# Patient Record
Sex: MALE | Race: WHITE | NOT HISPANIC OR LATINO | Employment: STUDENT | URBAN - METROPOLITAN AREA
[De-identification: names, ages, dates, MRNs, and addresses within clinical notes are randomized per-mention and may not be internally consistent; named-entity substitution may affect disease eponyms.]

---
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
## 2018-05-24 RX ORDER — DIAZEPAM ORAL 5 MG/5ML
SOLUTION ORAL
Qty: 450 ML | Refills: 0 | Status: SHIPPED | OUTPATIENT
Start: 2018-05-24 | End: 2018-06-20 | Stop reason: SDUPTHER

## 2018-05-24 NOTE — TELEPHONE ENCOUNTER
MA telephone mom to help schedule pt appt  MA offered 6/10@10am   Mom accept and voiced time and date of appt

## 2018-05-24 NOTE — TELEPHONE ENCOUNTER
----- Message from Kelly Duncan sent at 2018  3:43 PM CDT -----  Contact: Mom [phone]  The pt prescription is  so they need a new on sent over for the diazepam. The pharmacy on file is correct. Please call mom once this has been done.

## 2018-06-01 ENCOUNTER — TELEPHONE (OUTPATIENT)
Dept: OTOLARYNGOLOGY | Facility: CLINIC | Age: 18
End: 2018-06-01

## 2018-06-01 NOTE — TELEPHONE ENCOUNTER
Mom wanted Dr. Main to prescribe some medication for Jaiden, but Jaiden has not been here since 2014,  He has to be seen before medication can be prescribe.  Ear is draining and has a perforation.

## 2018-06-01 NOTE — TELEPHONE ENCOUNTER
----- Message from Brynn Mckinnon sent at 6/1/2018 11:29 AM CDT -----  Contact: patients mother   Needs Advice    Reason for call: Patients mother is calling to speak with someone about above patients ears. Patient possibly has ear infection and mom would like to know if there is something that can be prescribed until patient can get an appt.     Communication Preference: [phone]

## 2018-06-01 NOTE — TELEPHONE ENCOUNTER
----- Message from Ty Thorne sent at 6/1/2018 12:29 PM CDT -----  Contact: Mom   Patient Returning Call from [facility]    Who Left Message for Patient:Patricia  Communication Preference:[phone]  Additional Information:

## 2018-06-19 ENCOUNTER — PATIENT MESSAGE (OUTPATIENT)
Dept: GENETICS | Facility: CLINIC | Age: 18
End: 2018-06-19

## 2018-06-19 ENCOUNTER — TELEPHONE (OUTPATIENT)
Dept: GENETICS | Facility: CLINIC | Age: 18
End: 2018-06-19

## 2018-06-19 NOTE — TELEPHONE ENCOUNTER
----- Message from Christina Lee sent at 6/19/2018  4:53 PM CDT -----  Contact: [phone]  Needs Advice    Reason for call:The mom would like to have lab work done tomorrow      Communication Preference:Requesting a call back----[phone]  Additional Information:

## 2018-06-20 ENCOUNTER — OFFICE VISIT (OUTPATIENT)
Dept: PEDIATRIC NEUROLOGY | Facility: CLINIC | Age: 18
End: 2018-06-20
Payer: COMMERCIAL

## 2018-06-20 ENCOUNTER — LAB VISIT (OUTPATIENT)
Dept: LAB | Facility: HOSPITAL | Age: 18
End: 2018-06-20
Attending: PSYCHIATRY & NEUROLOGY
Payer: COMMERCIAL

## 2018-06-20 VITALS
HEART RATE: 85 BPM | SYSTOLIC BLOOD PRESSURE: 110 MMHG | DIASTOLIC BLOOD PRESSURE: 74 MMHG | WEIGHT: 127 LBS | BODY MASS INDEX: 23.98 KG/M2 | HEIGHT: 61 IN

## 2018-06-20 DIAGNOSIS — E88.40 MITOCHONDRIAL METABOLISM DISORDER: ICD-10-CM

## 2018-06-20 DIAGNOSIS — E88.40 MITOCHONDRIAL METABOLISM DISORDER: Primary | ICD-10-CM

## 2018-06-20 LAB
25(OH)D3+25(OH)D2 SERPL-MCNC: 38 NG/ML
ALBUMIN SERPL BCP-MCNC: 4.6 G/DL
ALP SERPL-CCNC: 120 U/L
ALT SERPL W/O P-5'-P-CCNC: 23 U/L
ANION GAP SERPL CALC-SCNC: 6 MMOL/L
AST SERPL-CCNC: 20 U/L
BASOPHILS # BLD AUTO: 0.03 K/UL
BASOPHILS NFR BLD: 0.5 %
BILIRUB SERPL-MCNC: 0.4 MG/DL
BUN SERPL-MCNC: 7 MG/DL
CALCIUM SERPL-MCNC: 10.3 MG/DL
CHLORIDE SERPL-SCNC: 101 MMOL/L
CO2 SERPL-SCNC: 30 MMOL/L
CREAT SERPL-MCNC: 0.8 MG/DL
DIFFERENTIAL METHOD: ABNORMAL
EOSINOPHIL # BLD AUTO: 0.1 K/UL
EOSINOPHIL NFR BLD: 0.8 %
ERYTHROCYTE [DISTWIDTH] IN BLOOD BY AUTOMATED COUNT: 13.5 %
EST. GFR  (AFRICAN AMERICAN): >60 ML/MIN/1.73 M^2
EST. GFR  (NON AFRICAN AMERICAN): >60 ML/MIN/1.73 M^2
GLUCOSE SERPL-MCNC: 88 MG/DL
HCT VFR BLD AUTO: 43.3 %
HGB BLD-MCNC: 14.3 G/DL
LACTATE SERPL-SCNC: 0.9 MMOL/L
LYMPHOCYTES # BLD AUTO: 2.4 K/UL
LYMPHOCYTES NFR BLD: 36.6 %
MCH RBC QN AUTO: 29 PG
MCHC RBC AUTO-ENTMCNC: 33 G/DL
MCV RBC AUTO: 88 FL
MONOCYTES # BLD AUTO: 0.5 K/UL
MONOCYTES NFR BLD: 7.5 %
NEUTROPHILS # BLD AUTO: 3.5 K/UL
NEUTROPHILS NFR BLD: 54.6 %
PLATELET # BLD AUTO: 404 K/UL
PMV BLD AUTO: 8.9 FL
POTASSIUM SERPL-SCNC: 4.8 MMOL/L
PROT SERPL-MCNC: 7.9 G/DL
RBC # BLD AUTO: 4.93 M/UL
SODIUM SERPL-SCNC: 137 MMOL/L
WBC # BLD AUTO: 6.42 K/UL

## 2018-06-20 PROCEDURE — 80053 COMPREHEN METABOLIC PANEL: CPT

## 2018-06-20 PROCEDURE — 82542 COL CHROMOTOGRAPHY QUAL/QUAN: CPT

## 2018-06-20 PROCEDURE — 83605 ASSAY OF LACTIC ACID: CPT

## 2018-06-20 PROCEDURE — 85025 COMPLETE CBC W/AUTO DIFF WBC: CPT | Mod: PO

## 2018-06-20 PROCEDURE — 83520 IMMUNOASSAY QUANT NOS NONAB: CPT

## 2018-06-20 PROCEDURE — 3008F BODY MASS INDEX DOCD: CPT | Mod: CPTII,S$GLB,, | Performed by: PSYCHIATRY & NEUROLOGY

## 2018-06-20 PROCEDURE — 99999 PR PBB SHADOW E&M-EST. PATIENT-LVL III: CPT | Mod: PBBFAC,,, | Performed by: PSYCHIATRY & NEUROLOGY

## 2018-06-20 PROCEDURE — 36415 COLL VENOUS BLD VENIPUNCTURE: CPT | Mod: PO

## 2018-06-20 PROCEDURE — 84210 ASSAY OF PYRUVATE: CPT

## 2018-06-20 PROCEDURE — 99214 OFFICE O/P EST MOD 30 MIN: CPT | Mod: S$GLB,,, | Performed by: PSYCHIATRY & NEUROLOGY

## 2018-06-20 PROCEDURE — 83918 ORGANIC ACIDS TOTAL QUANT: CPT

## 2018-06-20 PROCEDURE — 82306 VITAMIN D 25 HYDROXY: CPT

## 2018-06-20 RX ORDER — DIAZEPAM ORAL 5 MG/5ML
SOLUTION ORAL
Qty: 390 ML | Refills: 4 | Status: SHIPPED | OUTPATIENT
Start: 2018-06-20 | End: 2018-12-12

## 2018-06-20 NOTE — PROGRESS NOTES
Jaiden Sena is a 17-year-old male who was initially seen by me on 11/17/2009.    I followed Jaiden for seizures associated with autism, associated with   mitochondrial disease.  Jaiden returns today with his mother, father, brother.    Please see my original consultation of 11/17/2009 for a full history of chief   complaint.    I last saw Jaiden on 11/14/2017.    Initially, Jaiden was started on phenobarbital for his seizure disorder.  It did   not work well.  His seizure disorder has been controlled with Valium at   bedtime.  There was a question of a seizure over the summer of 2014.  Since   then, there have been no documented seizures.  I am trying to taper Jaiden's   nighttime Valium.  It has been challenging for mom to follow the directions.  At   this time, Jaiden was supposed to be on 13 mL p.o. at bedtime.  Mom is not sure   how much he is on.  She thinks he might be on 14 or 15 mL p.o. at bedtime.  It   is unlike mom to not know how much Jaiden is on.    Either way, I am going to drop Jaiden to 13 mL p.o. at bedtime for two months   and then 12 mL p.o. at bedtime.    Everyone is wearing their mask in the room today.    Jaiden has recently had a bout of otitis media associated with water in his ear   while diving and swimming.    Jaiden has had a significant weight loss.  Mom says he is eating well, but that   they have cut out the sugar.  Jaiden is sleeping well.    On neurologic examination today, Jaiden's weight is 57.6 kg (8th percentile,   down from the 37th percentile; last time Jaiden was here, his weight was 65.7   kg; weight loss of 8.1 kilos since November 2017).  Respiratory rate is 22 per   minute.  Blood pressure is 110/74.  Pulse rate is 85 per minute.    Jaiden is wearing his mask.  He and his brother are texting each other on their   cell phones (which are in zip locks) while he is here.  He does not make eye   contact with me.  He does acknowledge me.    Jaiden has no ataxia.  He has no  dysmetria.  He has no nystagmus.    Jaiden is a 17-year-old male with a history of mitochondrial disease, seizures,   well controlled; muscle spasms, fatigue, family history of mitochondrial   disease.  Again, I have explained to mother that I would like to continue to   decrease the Valium.  I have ordered labs to be seen by Genetics.    I would like to see Jaiden back in six months or sooner if there are problems.    Please send a copy to Dr. Venita Ayala.      YANICK/GRACIE  dd: 06/20/2018 11:01:59 (CDT)  td: 06/21/2018 04:02:54 (CDT)  Doc ID   #[id number]  Job ID #[id number]    CC: Venita Ayala M.D.

## 2018-06-21 LAB — TRYPTASE LEVEL: 3.2 NG/ML

## 2018-06-24 LAB — PYRUVATE BLD-SCNC: 0.06 MMOL/L (ref 0.03–0.11)

## 2018-06-26 LAB
ACYLCARNITINE SERPL-SCNC: 9 UMOL/L (ref 3–38)
CARNITINE FREE SERPL-SCNC: 43 UMOL/L (ref 22–63)
CARNITINE SERPL-SCNC: 0.2 UMOL/L (ref 0.1–0.9)
CARNITINE SERPL-SCNC: 52 UMOL/L (ref 31–78)

## 2018-06-29 LAB
2OXO3ME-VALERATE SERPL-SCNC: 15 UMOL/L (ref 10–30)
2OXOISOVALERATE SERPL-SCNC: 14 UMOL/L (ref 3–20)
2OXOISOVALERATE SERPL-SCNC: 19 UMOL/L (ref 20–75)
ACETOACET SERPL-SCNC: 4 UMOL/L (ref 0–66)
B-OH-BUTYR SERPL-SCNC: 17 UMOL/L (ref 0–30)
CITRATE SERPL-SCNC: 26 UMOL/L (ref 0–100)
COENZYME Q10, LEUKOCYTE: 315 PMOL/MG PROTEIN
LACTATE SERPL-SCNC: 1261 UMOL/L (ref 600–2600)
ORGANIC ACIDS PATTERN SERPL-IMP: NORMAL
PYRUVATE SERPL-SCNC: 78 UMOL/L (ref 20–140)
SUCCINATE SERPL-SCNC: 5 UMOL/L (ref 16–25)

## 2018-11-08 ENCOUNTER — TELEPHONE (OUTPATIENT)
Dept: OPTOMETRY | Facility: CLINIC | Age: 18
End: 2018-11-08

## 2018-11-21 ENCOUNTER — TELEPHONE (OUTPATIENT)
Dept: PEDIATRIC NEUROLOGY | Facility: CLINIC | Age: 18
End: 2018-11-21

## 2018-11-21 NOTE — TELEPHONE ENCOUNTER
----- Message from Kaylin Bautista sent at 11/21/2018  4:46 PM CST -----  Contact: Mom [phone]  Needs Advice    Reason for call:        Communication Preference: Mom [phone]    Additional Information:  Mom stated that pt has been having pain and twitching in his eye and temple area. Mom would like to speak with dr or nurse as soon as possible. She also stated that she having trouble getting into MyChart.

## 2018-11-21 NOTE — TELEPHONE ENCOUNTER
Mother states patient had been squinting his eyes and he is complaining of Right eye pain and right temporal twitching. Mother states he has an urgent eye appt Friday. Advised her to call after appt to give us an update and possible appt with Dr Leger

## 2018-11-23 ENCOUNTER — PATIENT MESSAGE (OUTPATIENT)
Dept: PEDIATRIC NEUROLOGY | Facility: CLINIC | Age: 18
End: 2018-11-23

## 2018-11-23 ENCOUNTER — PATIENT MESSAGE (OUTPATIENT)
Dept: GENETICS | Facility: CLINIC | Age: 18
End: 2018-11-23

## 2018-11-23 ENCOUNTER — OFFICE VISIT (OUTPATIENT)
Dept: OPTOMETRY | Facility: CLINIC | Age: 18
End: 2018-11-23
Payer: COMMERCIAL

## 2018-11-23 DIAGNOSIS — E88.40 DISORDER OF MITOCHONDRIAL METABOLISM: Primary | ICD-10-CM

## 2018-11-23 DIAGNOSIS — H52.13 MYOPIA OF BOTH EYES: ICD-10-CM

## 2018-11-23 PROCEDURE — 92015 DETERMINE REFRACTIVE STATE: CPT | Mod: S$GLB,,, | Performed by: OPTOMETRIST

## 2018-11-23 PROCEDURE — 92014 COMPRE OPH EXAM EST PT 1/>: CPT | Mod: S$GLB,,, | Performed by: OPTOMETRIST

## 2018-11-23 PROCEDURE — 99999 PR PBB SHADOW E&M-EST. PATIENT-LVL II: CPT | Mod: PBBFAC,,, | Performed by: OPTOMETRIST

## 2018-11-23 NOTE — PROGRESS NOTES
HPI     Jaiden Sena is a 18 y.o. male who is brought in by his parents  for   continued eye care. 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  He also has a seizure disorder. His last exam with me was   6/23/18. He was noted to have moderate bilateral myopia at that time.   There was no ptosis, strabismus or other ocular manifestation of turner   disease . Mom reports noting a twitching of a muscle in Jaiden's right   temporal. She reports that it is almost constant, especially when Jaiden   is looking at something. . He has also demonstrated symptoms about   bilateral eye pain which has progressed over the last 3 weeks.  He has   also complained of headaches and light sensitivity. Jaiden is reported to   wear his glasses with TV and computer viewing. Initially, it was only when   watching TV.  Mom reports that Jaiden now squints when he does not wear   his glasses (this is new over the past several months).     (+)blurred vision  (+)Headaches  (--)diplopia  (--)flashes  (--)floaters  (+)pain  (--)Itching  (--)tearing  (--)burning  (--)Dryness  (--) OTC Drops  (--)Photophobia    Last edited by Arianna Singletary, OD on 11/23/2018  3:33 PM. (History)        Review of Systems   Constitutional: Negative for chills, fever and malaise/fatigue.   HENT: Negative for congestion and hearing loss.    Eyes: Positive for blurred vision and pain. Negative for double vision, photophobia, discharge and redness.   Respiratory: Negative.    Cardiovascular: Negative.    Gastrointestinal: Negative.    Genitourinary: Negative.    Musculoskeletal: Negative.    Skin: Negative.    Neurological: Positive for headaches. Negative for seizures.   Endo/Heme/Allergies: Negative for environmental allergies.   Psychiatric/Behavioral: Negative.        Assessment /Plan     For exam results, see Encounter Report.    1. Disorder of mitochondrial metabolism  - No binocularly significant misalignment  - Basic  exophoria  - Asymptomatic  - no treatment needed at this time    2. Myopia of both eyes  - Spec Rx per final Rx below  Glasses Prescription (11/23/2018)        Sphere Cylinder    Right -5.25 Sphere    Left -5.25 Sphere    Type:  SVL    Expiration Date:  11/24/2019        3. 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

## 2018-12-03 ENCOUNTER — TELEPHONE (OUTPATIENT)
Dept: PEDIATRIC NEUROLOGY | Facility: CLINIC | Age: 18
End: 2018-12-03

## 2018-12-03 NOTE — TELEPHONE ENCOUNTER
----- Message from Blanquita Gaines sent at 12/3/2018  2:57 PM CST -----  Contact: Timothy Quesada [phone]  Reason for call: diazePAM (VALIUM) oral solution        Communication Preference: Timothy Quesada [phone]    Additional Information: Mom states patient's medication won't be available until the appt date and will be 5 days short of medicine. She is requesting a call back to discuss getting patient medication to last him until his appt.

## 2018-12-03 NOTE — TELEPHONE ENCOUNTER
Spoke to mother; states patient will be out of medication in 2 days. She is requesting a refill to last until appt on 12/11/2018. Medication called in to [facility] pharmacy.

## 2018-12-11 ENCOUNTER — PATIENT MESSAGE (OUTPATIENT)
Dept: PEDIATRIC NEUROLOGY | Facility: CLINIC | Age: 18
End: 2018-12-11

## 2018-12-11 ENCOUNTER — OFFICE VISIT (OUTPATIENT)
Dept: PEDIATRIC NEUROLOGY | Facility: CLINIC | Age: 18
End: 2018-12-11
Payer: COMMERCIAL

## 2018-12-11 ENCOUNTER — LAB VISIT (OUTPATIENT)
Dept: LAB | Facility: HOSPITAL | Age: 18
End: 2018-12-11
Attending: PSYCHIATRY & NEUROLOGY
Payer: COMMERCIAL

## 2018-12-11 VITALS
SYSTOLIC BLOOD PRESSURE: 122 MMHG | DIASTOLIC BLOOD PRESSURE: 75 MMHG | WEIGHT: 119.94 LBS | HEIGHT: 61 IN | HEART RATE: 87 BPM | BODY MASS INDEX: 22.64 KG/M2

## 2018-12-11 DIAGNOSIS — R63.4 WEIGHT LOSS: Primary | ICD-10-CM

## 2018-12-11 DIAGNOSIS — R63.4 WEIGHT LOSS: ICD-10-CM

## 2018-12-11 DIAGNOSIS — Z82.0 FAMILY HISTORY OF NEUROLOGICAL DISORDER: ICD-10-CM

## 2018-12-11 DIAGNOSIS — E88.40 DISORDER OF MITOCHONDRIAL METABOLISM: ICD-10-CM

## 2018-12-11 DIAGNOSIS — H52.13 MYOPIA OF BOTH EYES: ICD-10-CM

## 2018-12-11 DIAGNOSIS — G40.909 SEIZURE DISORDER: Chronic | ICD-10-CM

## 2018-12-11 DIAGNOSIS — Q99.9 GENETIC SYNDROME: ICD-10-CM

## 2018-12-11 DIAGNOSIS — F84.0 AUTISM: ICD-10-CM

## 2018-12-11 DIAGNOSIS — M62.81 MUSCLE WEAKNESS (GENERALIZED): ICD-10-CM

## 2018-12-11 LAB
ALBUMIN SERPL BCP-MCNC: 4.3 G/DL
ALP SERPL-CCNC: 109 U/L
ALT SERPL W/O P-5'-P-CCNC: 20 U/L
ANION GAP SERPL CALC-SCNC: 9 MMOL/L
AST SERPL-CCNC: 20 U/L
BASOPHILS # BLD AUTO: 0.03 K/UL
BASOPHILS NFR BLD: 0.5 %
BILIRUB SERPL-MCNC: 0.3 MG/DL
BUN SERPL-MCNC: 7 MG/DL
CALCIUM SERPL-MCNC: 10 MG/DL
CHLORIDE SERPL-SCNC: 103 MMOL/L
CO2 SERPL-SCNC: 28 MMOL/L
CREAT SERPL-MCNC: 0.7 MG/DL
CRP SERPL-MCNC: 0.24 MG/L
DIFFERENTIAL METHOD: ABNORMAL
EOSINOPHIL # BLD AUTO: 0.1 K/UL
EOSINOPHIL NFR BLD: 0.8 %
ERYTHROCYTE [DISTWIDTH] IN BLOOD BY AUTOMATED COUNT: 13.1 %
ERYTHROCYTE [SEDIMENTATION RATE] IN BLOOD BY WESTERGREN METHOD: 5 MM/HR
EST. GFR  (AFRICAN AMERICAN): >60 ML/MIN/1.73 M^2
EST. GFR  (NON AFRICAN AMERICAN): >60 ML/MIN/1.73 M^2
GLUCOSE SERPL-MCNC: 84 MG/DL
HCT VFR BLD AUTO: 42.5 %
HGB BLD-MCNC: 14.2 G/DL
LYMPHOCYTES # BLD AUTO: 2.5 K/UL
LYMPHOCYTES NFR BLD: 39.8 %
MCH RBC QN AUTO: 30 PG
MCHC RBC AUTO-ENTMCNC: 33.4 G/DL
MCV RBC AUTO: 90 FL
MONOCYTES # BLD AUTO: 0.5 K/UL
MONOCYTES NFR BLD: 7.5 %
NEUTROPHILS # BLD AUTO: 3.2 K/UL
NEUTROPHILS NFR BLD: 51.4 %
PLATELET # BLD AUTO: 422 K/UL
PMV BLD AUTO: 9 FL
POTASSIUM SERPL-SCNC: 4.1 MMOL/L
PREALB SERPL-MCNC: 24 MG/DL
PROT SERPL-MCNC: 7.6 G/DL
RBC # BLD AUTO: 4.74 M/UL
SODIUM SERPL-SCNC: 140 MMOL/L
TSH SERPL DL<=0.005 MIU/L-ACNC: 1.2 UIU/ML
WBC # BLD AUTO: 6.15 K/UL

## 2018-12-11 PROCEDURE — 84134 ASSAY OF PREALBUMIN: CPT

## 2018-12-11 PROCEDURE — 99214 OFFICE O/P EST MOD 30 MIN: CPT | Mod: S$GLB,,, | Performed by: PSYCHIATRY & NEUROLOGY

## 2018-12-11 PROCEDURE — 85652 RBC SED RATE AUTOMATED: CPT

## 2018-12-11 PROCEDURE — 3008F BODY MASS INDEX DOCD: CPT | Mod: CPTII,S$GLB,, | Performed by: PSYCHIATRY & NEUROLOGY

## 2018-12-11 PROCEDURE — 80053 COMPREHEN METABOLIC PANEL: CPT

## 2018-12-11 PROCEDURE — 99999 PR PBB SHADOW E&M-EST. PATIENT-LVL III: CPT | Mod: PBBFAC,,, | Performed by: PSYCHIATRY & NEUROLOGY

## 2018-12-11 PROCEDURE — 85025 COMPLETE CBC W/AUTO DIFF WBC: CPT | Mod: PO

## 2018-12-11 PROCEDURE — 84443 ASSAY THYROID STIM HORMONE: CPT

## 2018-12-11 PROCEDURE — 36415 COLL VENOUS BLD VENIPUNCTURE: CPT | Mod: PO

## 2018-12-11 PROCEDURE — 86141 C-REACTIVE PROTEIN HS: CPT

## 2018-12-11 NOTE — PROGRESS NOTES
Jaiden Sena is a 18-year-old male who was initially seen by me on 11/17/2009.    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  Jaiden   returns with his mother, father, brother.    Please see my original consultation of 11/17/2009 for full history of the chief   complaint.    I last saw Jaiden on 06/25/2018.    Initially, I started Jaiden on phenobarbital for seizure disorder.  It did not   work.  He has been on Valium for his seizures.  Mom says he takes 12 mL p.o. at   bedtime.  He was supposed to be 9 at this time.    The bigger problem is that Jaiden at this point is having twitching around the   right-sided temple.  Apparently, he saw Dr. Singletary.  I have had the opportunity   to review Jaiden's chart including labs, notes, imaging.  Mom says he has pain   in that region when it happens.  However, mostly he is concerned by the   twitching.    Mom says that more screen time worse since the twitching.  Jaiden has a history   of myopia.    Jaiden is eating well.  He is sleeping well.  No diarrhea.  He has lost another   3 kilos since he was here last.  He has had no illnesses.    On neurologic examination, Jaiden's blood pressure is 122/75.  His pulse rate is   87 per minute.  His weight is 54.4 kg (last time he was here was 57.6 kg).    Height is ____ cm (less than 2nd percentile).  Respiratory rate is 22 per   minute.    Jaiden's exam is unchanged.  He has no ataxia.  He has no dysmetria.  He has no   nystagmus.  He is wearing his mask.  He seems more confused than usual.  He is   asking me if on his neurologist.  He usually knows.  However, he is able to   ambulate and get about.  Heart reveals regular rate and rhythm.  Lungs are   clear.    I am not going to cut the Valium any further at this time.  He is to remain on   12 mL p.o. at bedtime.  I would like to get some labs today.  I am concerned   about the continuing weight loss as well as the twitching of the right side of   Jaiden's  face.    Labs will be drawn.  I will check with Dr. Milton.      LEONIEA/IN  dd: 12/11/2018 14:45:09 (CST)  td: 12/12/2018 10:32:02 (CST)  Doc ID   #[id number]  Job ID #[id number]    CC: Venita Milton M.D.

## 2018-12-12 ENCOUNTER — PATIENT MESSAGE (OUTPATIENT)
Dept: PEDIATRIC NEUROLOGY | Facility: CLINIC | Age: 18
End: 2018-12-12

## 2018-12-12 ENCOUNTER — TELEPHONE (OUTPATIENT)
Dept: PEDIATRIC NEUROLOGY | Facility: CLINIC | Age: 18
End: 2018-12-12

## 2018-12-12 RX ORDER — DIAZEPAM ORAL 5 MG/5ML
SOLUTION ORAL
Qty: 360 ML | Refills: 4 | Status: SHIPPED | OUTPATIENT
Start: 2018-12-12 | End: 2019-06-04 | Stop reason: SDUPTHER

## 2018-12-12 NOTE — TELEPHONE ENCOUNTER
----- Message from Joslyn Luna sent at 12/11/2018  6:02 PM CST -----  Contact: patient's mother kasie  Says patient's medication was never called in to pharmacy.     She can be reached at [phone]    Thanks  KB

## 2018-12-18 ENCOUNTER — TELEPHONE (OUTPATIENT)
Dept: GENETICS | Facility: CLINIC | Age: 18
End: 2018-12-18

## 2018-12-18 NOTE — TELEPHONE ENCOUNTER
Contact: Jaiden Sena/Sangita Sena    Called to confirm patient's appointment with Dr. Milton on 12/19/2018 at 3 pm. No answer. Left voicemail message with appointment information.

## 2018-12-19 ENCOUNTER — TELEPHONE (OUTPATIENT)
Dept: GENETICS | Facility: CLINIC | Age: 18
End: 2018-12-19

## 2018-12-19 NOTE — TELEPHONE ENCOUNTER
Called to reschedule appointments (provider request) today with Dr. Milton. No answer. Left message with mom, Mrs. Quesada, to return call to clinic to reschedule.

## 2018-12-27 ENCOUNTER — PATIENT MESSAGE (OUTPATIENT)
Dept: GENETICS | Facility: CLINIC | Age: 18
End: 2018-12-27

## 2019-05-01 ENCOUNTER — PATIENT MESSAGE (OUTPATIENT)
Dept: PEDIATRIC NEUROLOGY | Facility: CLINIC | Age: 19
End: 2019-05-01

## 2019-06-04 ENCOUNTER — LAB VISIT (OUTPATIENT)
Dept: LAB | Facility: HOSPITAL | Age: 19
End: 2019-06-04
Attending: PSYCHIATRY & NEUROLOGY
Payer: COMMERCIAL

## 2019-06-04 ENCOUNTER — OFFICE VISIT (OUTPATIENT)
Dept: PEDIATRIC NEUROLOGY | Facility: CLINIC | Age: 19
End: 2019-06-04
Payer: COMMERCIAL

## 2019-06-04 VITALS
SYSTOLIC BLOOD PRESSURE: 101 MMHG | HEART RATE: 93 BPM | WEIGHT: 111 LBS | HEIGHT: 61 IN | DIASTOLIC BLOOD PRESSURE: 67 MMHG | BODY MASS INDEX: 20.96 KG/M2

## 2019-06-04 DIAGNOSIS — F84.0 AUTISM: ICD-10-CM

## 2019-06-04 DIAGNOSIS — R63.4 WEIGHT LOSS: Primary | ICD-10-CM

## 2019-06-04 DIAGNOSIS — M62.81 MUSCLE WEAKNESS (GENERALIZED): ICD-10-CM

## 2019-06-04 DIAGNOSIS — G40.909 SEIZURE DISORDER: Chronic | ICD-10-CM

## 2019-06-04 DIAGNOSIS — E88.40 DISORDER OF MITOCHONDRIAL METABOLISM: ICD-10-CM

## 2019-06-04 DIAGNOSIS — R63.4 WEIGHT LOSS: ICD-10-CM

## 2019-06-04 DIAGNOSIS — Z82.0 FAMILY HISTORY OF NEUROLOGICAL DISORDER: ICD-10-CM

## 2019-06-04 DIAGNOSIS — Q99.9 GENETIC SYNDROME: ICD-10-CM

## 2019-06-04 LAB
ALBUMIN SERPL BCP-MCNC: 3.9 G/DL (ref 3.5–5.2)
ALP SERPL-CCNC: 85 U/L (ref 55–135)
ALT SERPL W/O P-5'-P-CCNC: 22 U/L (ref 10–44)
ANION GAP SERPL CALC-SCNC: 7 MMOL/L (ref 8–16)
AST SERPL-CCNC: 19 U/L (ref 10–40)
BASOPHILS # BLD AUTO: 0.07 K/UL (ref 0–0.2)
BASOPHILS NFR BLD: 1 % (ref 0–1.9)
BILIRUB SERPL-MCNC: 0.3 MG/DL (ref 0.1–1)
BUN SERPL-MCNC: 6 MG/DL (ref 6–20)
CALCIUM SERPL-MCNC: 9.7 MG/DL (ref 8.7–10.5)
CHLORIDE SERPL-SCNC: 104 MMOL/L (ref 95–110)
CO2 SERPL-SCNC: 28 MMOL/L (ref 23–29)
CREAT SERPL-MCNC: 0.7 MG/DL (ref 0.5–1.4)
DIFFERENTIAL METHOD: ABNORMAL
EOSINOPHIL # BLD AUTO: 0.1 K/UL (ref 0–0.5)
EOSINOPHIL NFR BLD: 1.2 % (ref 0–8)
ERYTHROCYTE [DISTWIDTH] IN BLOOD BY AUTOMATED COUNT: 13.2 % (ref 11.5–14.5)
EST. GFR  (AFRICAN AMERICAN): >60 ML/MIN/1.73 M^2
EST. GFR  (NON AFRICAN AMERICAN): >60 ML/MIN/1.73 M^2
GLUCOSE SERPL-MCNC: 79 MG/DL (ref 70–110)
HCT VFR BLD AUTO: 40.3 % (ref 40–54)
HGB BLD-MCNC: 12.9 G/DL (ref 14–18)
LYMPHOCYTES # BLD AUTO: 3.1 K/UL (ref 1–4.8)
LYMPHOCYTES NFR BLD: 45.8 % (ref 18–48)
MCH RBC QN AUTO: 29.1 PG (ref 27–31)
MCHC RBC AUTO-ENTMCNC: 32 G/DL (ref 32–36)
MCV RBC AUTO: 91 FL (ref 82–98)
MONOCYTES # BLD AUTO: 0.6 K/UL (ref 0.3–1)
MONOCYTES NFR BLD: 8.3 % (ref 4–15)
NEUTROPHILS # BLD AUTO: 2.9 K/UL (ref 1.8–7.7)
NEUTROPHILS NFR BLD: 42.5 % (ref 38–73)
NRBC BLD-RTO: 0 /100 WBC
PLATELET # BLD AUTO: 674 K/UL (ref 150–350)
PMV BLD AUTO: 8.4 FL (ref 9.2–12.9)
POTASSIUM SERPL-SCNC: 4.2 MMOL/L (ref 3.5–5.1)
PROT SERPL-MCNC: 7.4 G/DL (ref 6–8.4)
RBC # BLD AUTO: 4.43 M/UL (ref 4.6–6.2)
SODIUM SERPL-SCNC: 139 MMOL/L (ref 136–145)
WBC # BLD AUTO: 6.83 K/UL (ref 3.9–12.7)

## 2019-06-04 PROCEDURE — 99214 PR OFFICE/OUTPT VISIT, EST, LEVL IV, 30-39 MIN: ICD-10-PCS | Mod: S$GLB,,, | Performed by: PSYCHIATRY & NEUROLOGY

## 2019-06-04 PROCEDURE — 99214 OFFICE O/P EST MOD 30 MIN: CPT | Mod: S$GLB,,, | Performed by: PSYCHIATRY & NEUROLOGY

## 2019-06-04 PROCEDURE — 36415 COLL VENOUS BLD VENIPUNCTURE: CPT | Mod: PO

## 2019-06-04 PROCEDURE — 85025 COMPLETE CBC W/AUTO DIFF WBC: CPT

## 2019-06-04 PROCEDURE — 99999 PR PBB SHADOW E&M-EST. PATIENT-LVL III: ICD-10-PCS | Mod: PBBFAC,,, | Performed by: PSYCHIATRY & NEUROLOGY

## 2019-06-04 PROCEDURE — 82542 COL CHROMOTOGRAPHY QUAL/QUAN: CPT

## 2019-06-04 PROCEDURE — 80053 COMPREHEN METABOLIC PANEL: CPT

## 2019-06-04 PROCEDURE — 3008F BODY MASS INDEX DOCD: CPT | Mod: CPTII,S$GLB,, | Performed by: PSYCHIATRY & NEUROLOGY

## 2019-06-04 PROCEDURE — 99999 PR PBB SHADOW E&M-EST. PATIENT-LVL III: CPT | Mod: PBBFAC,,, | Performed by: PSYCHIATRY & NEUROLOGY

## 2019-06-04 PROCEDURE — 3008F PR BODY MASS INDEX (BMI) DOCUMENTED: ICD-10-PCS | Mod: CPTII,S$GLB,, | Performed by: PSYCHIATRY & NEUROLOGY

## 2019-06-04 RX ORDER — DIAZEPAM ORAL 5 MG/5ML
SOLUTION ORAL
Qty: 360 ML | Refills: 4 | Status: SHIPPED | OUTPATIENT
Start: 2019-06-04 | End: 2020-01-06 | Stop reason: SDUPTHER

## 2019-06-04 NOTE — PROGRESS NOTES
Jaiden Sena is a 18-year-old male who was initially seen by me on 11/17/2009.    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  Jaiden   returned with his mother, father, brother.    I last saw Jaiden on 12/11/2018.  Please see my original consultation of   11/17/2009 for full history of the chief complaint.    Initially, I started Jaiden on phenobarbital for seizure disorder.  It did not   work.  He has been on Valium for seizures.  He is still on 12 mL p.o. at   bedtime.  We have been trying to decrease it for a long time.    However, the bigger problem at this time is that Jaiden does not look well.  He   has had substantial weight loss.  Mom says he has had sinus disease.  He has   been on Rocephin and Omnicef.  She says he is not sleeping well.    The question of reflux versus sinusitis has been raised.    More upsetting to me is that Jaiden does not know who I am.  He is talking about   ptosis.  He is asking who I am.  He does not feel like Jaiden today.    Mom says he has not had a good appetite since he got sick.  He has been on a   liquid diet for one and a half weeks.    The bottom line is Jaiden is down from 65.6 kilos in November 2017 to 50.35   kilos in June 2019.  Therefore, he has had a 15 kilo weight loss in the last   year and a half.  Jaiden does not have 15 kilos to lose.    Blood pressure is 101/67.  Pulse rate is 93 per minute.  Height is 155.5 cm   (less than 2nd percentile).  Weight is 50.35 kg (less than 2nd percentile).    Respiratory rate is 22 per minute.    Jaiden is wearing new glasses.  Mom says that they are not well adjusted.  They   are too big.    Jaiden is not wearing his mask.    Last time Jaiden was here, he asked me if I was his neurologist.  At this time,   he does not seem to have any idea who I am.  He keeps calling me Miss.    He ambulates and gets about.  Heart reveals regular rate and rhythm.  Lungs are   clear.    I am going to leave the Valium at  12 mL p.o. at bedtime during this illness.    Mom and I have once again talked about decreasing it.  Mom says she is doing   well because the valium started out at 20 and now it is down to 12.  I have   explained that I would like to have him off of it.    Labs will be drawn today.  I would like Jaiden to see Dr. Milton.  I will try   to get in touch with Dr. Milton regarding any further labs.      DKA/HN  dd: 06/04/2019 15:15:31 (CDT)  td: 06/05/2019 05:53:17 (CDT)  Doc ID   #[id number]  Job ID #[id number]    CC: Venita Milton M.D.

## 2019-06-05 ENCOUNTER — TELEPHONE (OUTPATIENT)
Dept: GENETICS | Facility: CLINIC | Age: 19
End: 2019-06-05

## 2019-06-05 NOTE — TELEPHONE ENCOUNTER
----- Message from Steve Milton MD sent at 6/4/2019  5:19 PM CDT -----  Reach out to them that Dr. Leger is worried and wants me to see him and Fidencio (next [place])

## 2019-06-11 LAB
ACYLCARNITINE SERPL-SCNC: 9 UMOL/L (ref 3–38)
CARN ESTERS/C0 SERPL-SRTO: 0.2 {RATIO} (ref 0.1–0.9)
CARNITINE FREE SERPL-SCNC: 37 UMOL/L (ref 22–63)
CARNITINE SERPL-SCNC: 46 UMOL/L (ref 31–78)

## 2019-06-19 LAB — COENZYME Q10, LEUKOCYTE: 300 PMOL/MG PROTEIN (ref 66–183)

## 2019-09-17 ENCOUNTER — TELEPHONE (OUTPATIENT)
Dept: GENETICS | Facility: CLINIC | Age: 19
End: 2019-09-17

## 2019-09-17 NOTE — TELEPHONE ENCOUNTER
Contact: Jaiden Sena    Called to confirm patient's appointment with Dr. Milton on 9/18/2019 at 1 pm. No answer. Left voicemail message with appointment information.

## 2019-09-18 ENCOUNTER — OFFICE VISIT (OUTPATIENT)
Dept: GENETICS | Facility: CLINIC | Age: 19
End: 2019-09-18
Payer: COMMERCIAL

## 2019-09-18 VITALS — BODY MASS INDEX: 21.4 KG/M2 | HEIGHT: 60 IN | WEIGHT: 109 LBS

## 2019-09-18 DIAGNOSIS — E88.40 DISORDER OF MITOCHONDRIAL METABOLISM: Primary | ICD-10-CM

## 2019-09-18 DIAGNOSIS — M62.81 MUSCLE WEAKNESS (GENERALIZED): ICD-10-CM

## 2019-09-18 DIAGNOSIS — G40.909 SEIZURE DISORDER: Chronic | ICD-10-CM

## 2019-09-18 DIAGNOSIS — F84.0 AUTISM: ICD-10-CM

## 2019-09-18 DIAGNOSIS — Q99.9 GENETIC SYNDROME: ICD-10-CM

## 2019-09-18 PROCEDURE — 99215 PR OFFICE/OUTPT VISIT, EST, LEVL V, 40-54 MIN: ICD-10-PCS | Mod: S$GLB,,, | Performed by: MEDICAL GENETICS

## 2019-09-18 PROCEDURE — 3008F BODY MASS INDEX DOCD: CPT | Mod: CPTII,S$GLB,, | Performed by: MEDICAL GENETICS

## 2019-09-18 PROCEDURE — 99215 OFFICE O/P EST HI 40 MIN: CPT | Mod: S$GLB,,, | Performed by: MEDICAL GENETICS

## 2019-09-18 PROCEDURE — 3008F PR BODY MASS INDEX (BMI) DOCUMENTED: ICD-10-PCS | Mod: CPTII,S$GLB,, | Performed by: MEDICAL GENETICS

## 2019-09-19 NOTE — PROGRESS NOTES
Jaiden Sena   DOS: 17  : [birth year]  MRN: [id number]    PRESENT ILLNESS: I have seen this 18-year-old  male with his 19-year-old brother Jamie previously for evaluation of possible genetic etiology of his high-functioning autism and history of developmental delay. I could not appreciate any well-recognizable genetic syndrome and obtained several genetic tests including chromosomal oligo array, growth hormone, and metabolic studies. His fragile X was normal in the past, and his metabolic studies were significant for high lactic acid levels at 3.89 and high lactic-to-pyruvic acid levels as well as abnormal ratios of plasma amino acids suggestive of mitochondrial dysfunction (alanine to lysine and alanine to the sum of phenylalanine and tyrosine. 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 Maximiliano tested his mom ([id number]) who may have a mitochondrial disorder and she also has the variant. I've also tested his grandmother who has the same variant. All benefitted from CoQ10 but effect was limited. His whole exome sequencing (SRINIVASAN) was nondiagnostic but reanalysis did reveal significant results as discussed below.    Jaiden now returns for a follow-up and further evaluation and treatment with his brother, and mother (last time seen in 2017). In the interim, he has lost weight and does have some fatigue.     PHYSICAL EXAM: Weight 109 lbs (40 lbs loss in 2 years) while height 5, BMI 21 (93%). Again, Jaiden is normocephalic and has no significant dysmorphic facial features. He told me that he decided to learn Emir[place] recently and told me hi in [place]. He does appear thin.    IMPRESSION: At this time, I again discussed the SRINIVASAN results. 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 His brother Fidencio only had one variant Q4946S.    The SHANK1 gene is a member of the SHANK gene family which encodes scaffolding proteins required for the proper formation and function of neuronal synapses. Microdeletions in the SHANK1 locus have been reported in five males with autism spectrum disorders. Four of these individuals were part of a multigenerational family in which males were diagnosed with high-functioning autism spectrum disorder while females were unaffected. The fifth male patient, who also had high functioning ASD, was from an unrelated family and had a de dorene deletion in the same locus.    These findings in the SHANK1 gene may indeed explain both brothers because Jaidens more severely affected than Fidencio. Interestingly, their father also exhibited several features of high functioning ASD as a child. I will request another reanalysis and perhaps itll become a definitive diagnosis. We can also consider Whole Genome Sequencing (WGS).    As previously discussed, Adeola mother was found to have a pathogenic variant 2797G>A (V933I) in OPA1 gene causing autosomal dominant optic atrophy type 1 (OPA1) http://www.ncbi.nlm.nih.gov/books/OUE5097 (on the 200-gene panel for nuclear mitochondrial genes at [facility]). I did recommend an eye exam which she has not done yet so Maximiliano encouraged her to do so. OPA1 can also cause mitochondrial dysfunction and is associated with variable phenotypes such as ptosis (the mother has it), ophthalmoplegia, sensorineural hearing loss (she says that she doesnt have a hearing problem but hasnt had a hearing test), ataxia, and myopathy, suggesting that variant of OPA1 may be responsible for a continuum of phenotypes ranging from mild disorders affecting only the retinal ganglion cells to a severe and multisystemic disease.    Jaiden and Jeremy did not inherit the OPA1 variant from their mother so their disease process is different from their moms although their  symptoms overlap. 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 Again, muscle biopsy may be helpful on the mother. Recent evidence suggests that m.6712A>T is likely benign.    RECOMMENDATIONS:   1. Reanalysis of SRINIVASAN vs WGS.  2. Importance of exercise and nutrition has been discussed.  3. Referral to GI for weight loss.  4. Follow up in 1-2 years.    Time spent: 60 minutes, more than 50% was spent in counseling. The note is in epic.     Steve Milton M.D.   Section Head - Medical Genetics   [facility]

## 2019-10-20 ENCOUNTER — PATIENT MESSAGE (OUTPATIENT)
Dept: PEDIATRIC NEUROLOGY | Facility: CLINIC | Age: 19
End: 2019-10-20

## 2020-01-03 ENCOUNTER — PATIENT MESSAGE (OUTPATIENT)
Dept: GENETICS | Facility: CLINIC | Age: 20
End: 2020-01-03

## 2020-01-03 NOTE — TELEPHONE ENCOUNTER
Spoke w/ pt mom, advised pt mom that the blood work orders are still in the system and they are able to go to any [facility] location to get this done. Pt mom verbalized understanding.

## 2020-01-06 ENCOUNTER — OFFICE VISIT (OUTPATIENT)
Dept: PEDIATRIC NEUROLOGY | Facility: CLINIC | Age: 20
End: 2020-01-06
Payer: COMMERCIAL

## 2020-01-06 ENCOUNTER — LAB VISIT (OUTPATIENT)
Dept: LAB | Facility: HOSPITAL | Age: 20
End: 2020-01-06
Attending: MEDICAL GENETICS
Payer: COMMERCIAL

## 2020-01-06 VITALS
BODY MASS INDEX: 19.19 KG/M2 | WEIGHT: 104.25 LBS | HEART RATE: 99 BPM | HEIGHT: 62 IN | DIASTOLIC BLOOD PRESSURE: 59 MMHG | SYSTOLIC BLOOD PRESSURE: 107 MMHG

## 2020-01-06 DIAGNOSIS — G40.909 SEIZURE DISORDER: Chronic | ICD-10-CM

## 2020-01-06 DIAGNOSIS — R63.4 WEIGHT LOSS: Primary | ICD-10-CM

## 2020-01-06 DIAGNOSIS — F84.0 AUTISM: ICD-10-CM

## 2020-01-06 DIAGNOSIS — E88.40 DISORDER OF MITOCHONDRIAL METABOLISM: ICD-10-CM

## 2020-01-06 DIAGNOSIS — Z82.0 FAMILY HISTORY OF NEUROLOGICAL DISORDER: ICD-10-CM

## 2020-01-06 PROCEDURE — 99999 PR PBB SHADOW E&M-EST. PATIENT-LVL III: ICD-10-PCS | Mod: PBBFAC,,, | Performed by: PSYCHIATRY & NEUROLOGY

## 2020-01-06 PROCEDURE — 82085 ASSAY OF ALDOLASE: CPT

## 2020-01-06 PROCEDURE — 3008F BODY MASS INDEX DOCD: CPT | Mod: CPTII,S$GLB,, | Performed by: PSYCHIATRY & NEUROLOGY

## 2020-01-06 PROCEDURE — 84134 ASSAY OF PREALBUMIN: CPT

## 2020-01-06 PROCEDURE — 99214 OFFICE O/P EST MOD 30 MIN: CPT | Mod: S$GLB,,, | Performed by: PSYCHIATRY & NEUROLOGY

## 2020-01-06 PROCEDURE — 99214 PR OFFICE/OUTPT VISIT, EST, LEVL IV, 30-39 MIN: ICD-10-PCS | Mod: S$GLB,,, | Performed by: PSYCHIATRY & NEUROLOGY

## 2020-01-06 PROCEDURE — 82542 COL CHROMOTOGRAPHY QUAL/QUAN: CPT

## 2020-01-06 PROCEDURE — 36415 COLL VENOUS BLD VENIPUNCTURE: CPT

## 2020-01-06 PROCEDURE — 99999 PR PBB SHADOW E&M-EST. PATIENT-LVL III: CPT | Mod: PBBFAC,,, | Performed by: PSYCHIATRY & NEUROLOGY

## 2020-01-06 PROCEDURE — 82550 ASSAY OF CK (CPK): CPT

## 2020-01-06 PROCEDURE — 3008F PR BODY MASS INDEX (BMI) DOCUMENTED: ICD-10-PCS | Mod: CPTII,S$GLB,, | Performed by: PSYCHIATRY & NEUROLOGY

## 2020-01-06 PROCEDURE — 83520 IMMUNOASSAY QUANT NOS NONAB: CPT

## 2020-01-06 RX ORDER — DIAZEPAM ORAL 5 MG/5ML
SOLUTION ORAL
Qty: 360 ML | Refills: 4 | Status: SHIPPED | OUTPATIENT
Start: 2020-01-06 | End: 2020-06-17 | Stop reason: SDUPTHER

## 2020-01-06 NOTE — PROGRESS NOTES
Jaiden Sena is a 19-year-old male who was initially seen by me on 11/17/2009.  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  He returns today with his mother, father, brother.    I last saw him on 06/04/2019.  Please see my original consultation of 11/07/2009 for full history of the chief complaint.    Initially, heather was on phenobarbital for seizure disorder.  It did not work.  He has been on Valium for his seizures.  He is still on 12 cc p.o. q.h.s..  We have talked about decreasing by 0.5 cc every other month for a very long time.  It has not happened.  Therefore, I have asked Mom to please cut back to 11 and 1/2 cc p.o. q.h.s..    I have had concerns about Jaiden's confusion in the past.  I am still worried about it.  I am told that he has had a sinus infection since I saw him last.    He sleeps okay.  Mom says he is eating better now.  He is taking a math review course now.  Hopefully he will be a home school graduate according to mother.    One month ago, mom added digestive enzymes to his diet.  She thinks maybe it helped.    In November of 2017, the child weighed 65.6 kg.  He now weighs 47.3 kg    Blood pressure is 107/59.  Pulse rate 99 per minute.  Respiratory rate 22 per minute.  Height is 156.3 cm (a growth of 0.8 cm).  (less than the 2nd percentile).  Weight is 47.3 kg (a decrease of 3 kilos since he was here last; less than the 2nd percentile).    Jaiden is wearing his glasses.  He appears confused.  He says to me are you [place]?  I say yes.  He still is looks confused.  He then asks me again.  Even when I asked him to get up and walk, he appears confused.  Mom says he is just not listening well.    He walks without difficulty.  He has no tremor.    Extraocular movements are full and conjugate.    Heart reveals regular rate and rhythm.  Lungs are clear.    Neither boy is wearing their mask today.    I have written a prescription for Valium 11 have cc p.o. q.h.s..  I have once  again talked about decreasing it.  I would like see Jaiden back in 6 months or sooner if there are problems.

## 2020-01-07 LAB
CK SERPL-CCNC: 80 U/L (ref 20–200)
PREALB SERPL-MCNC: 25 MG/DL (ref 20–43)

## 2020-01-08 LAB — ALDOLASE SERPL-CCNC: 2.1 U/L (ref 1.2–7.6)

## 2020-01-10 LAB
ACYLCARNITINE SERPL-SCNC: 18 UMOL/L (ref 5–29)
CARN ESTERS/C0 SERPL-SRTO: 0.5 {RATIO} (ref 0.1–1)
CARNITINE FREE SERPL-SCNC: 38 UMOL/L (ref 25–60)
CARNITINE SERPL-SCNC: 56 UMOL/L (ref 34–86)
HEMATOLOGIST REVIEW: 323 PG/ML

## 2020-01-30 LAB — COENZYME Q10, LEUKOCYTE: 367 PMOL/MG PROTEIN (ref 66–183)

## 2020-04-15 ENCOUNTER — OFFICE VISIT (OUTPATIENT)
Dept: GENETICS | Facility: CLINIC | Age: 20
End: 2020-04-15
Payer: COMMERCIAL

## 2020-04-15 DIAGNOSIS — Q99.9 GENETIC SYNDROME: ICD-10-CM

## 2020-04-15 DIAGNOSIS — E88.40 DISORDER OF MITOCHONDRIAL METABOLISM: Primary | ICD-10-CM

## 2020-04-15 DIAGNOSIS — M62.81 MUSCLE WEAKNESS (GENERALIZED): ICD-10-CM

## 2020-04-15 DIAGNOSIS — F84.0 AUTISM: ICD-10-CM

## 2020-04-15 PROCEDURE — 99215 PR OFFICE/OUTPT VISIT, EST, LEVL V, 40-54 MIN: ICD-10-PCS | Mod: GT,,, | Performed by: MEDICAL GENETICS

## 2020-04-15 PROCEDURE — 99215 OFFICE O/P EST HI 40 MIN: CPT | Mod: GT,,, | Performed by: MEDICAL GENETICS

## 2020-04-16 NOTE — PROGRESS NOTES
Jaiden Sena   DOS: [birth year]  : [birth year]  MRN: [id number]    TELEMEDICINE VIDEO VISIT    The patient location is: [facility]  The chief complaint leading to consultation is: autism  Total time spent with patient: 60 minutes  Visit type: Virtual visit with synchronous audio and video    Each patient to whom he or she provides medical services by telemedicine is: (1) informed of the relationship between the physician and patient and the respective role of any other health care provider with respect to management of the patient; and (2) notified that he or she may decline to receive medical services by telemedicine and may withdraw from such care at any time.    PRESENT ILLNESS: I have seen this 19-year-old  male with his 19-year-old brother Jamie previously for evaluation of possible genetic etiology of his high-functioning autism and history of developmental delay. I could not appreciate any well-recognizable genetic syndrome and obtained several genetic tests including chromosomal oligo array, growth hormone, and metabolic studies. His fragile X was normal in the past, and his metabolic studies were significant for high lactic acid levels at 3.89 and high lactic-to-pyruvic acid levels as well as abnormal ratios of plasma amino acids suggestive of mitochondrial dysfunction (alanine to lysine and alanine to the sum of phenylalanine and tyrosine. 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 Maximiliano tested his mom ([id number]) who may have a mitochondrial disorder and she also has the variant. I've also tested his grandmother who has the same variant. All benefitted from CoQ10 but effect was limited. His whole exome sequencing (SRINIVASAN) in  and then 2 reanalyses in  and  did reveal significant results as discussed below.    Jaiden now returns for a virtual follow-up with his brother, and mother. In the interim, he has lost weight and continues to have  fatigue. He recently had an ear infection. Both brothers are homeschooled.    PAST MEDICAL HISTORY:   Seizure disorder    Disorder of mitochondrial metabolism    Autism    Muscle weakness (generalized)    Family history of neurological disorder    Myopia of both eyes    Genetic syndrome    Weight loss    MEDICATIONS:   b complex vitamins capsule     co-enzyme Q-10 30 mg capsule    diazePAM (VALIUM) oral solution    vitamin D 1000 units Tab     ALLERGIES:   Lactated Ringers  Peanuts  Propofol  Sulfa (Sulfonamide Antibiotics)Swelling    FAMILY HISTORY: As above, Jaiden has a 19-year-old brother Jamie ([id number]), also with autism which is milder than in Jaiden. Mitochondrial disease has been suspected but not definitively confirmed. The mother is 53 years old and has OPA1 mitochondrial disorder which neither of her sons inherited. She has fatigue and MCAS. Further family history is unremarkable. The parents denied consanguinity.      PHYSICAL EXAM: On 1/5/20 his weight 104 lbs while height 51, BMI 19. Again, Jaiden is normocephalic and has no significant dysmorphic facial features. He looks thinner and grew a beard. He told me that hes learning Salvadorean and told me hi and vodka in Salvadorean. He does appear thin.    IMPRESSION: At this time, I again discussed the SRINIVASAN results. There were no deleterious variants found in 2014 but reanalysis in 2017 revealed 2 variants of unclear significance (VUS) in the candidate gene SHANK1 were found (paternal R41Q and maternal Q8445F) so hes compound heterozygote. His brother Fidencio only had one variant K2828U.    The SHANK1 gene is a member of the SHANK gene family which encodes scaffolding proteins required for the proper formation and function of neuronal synapses. Microdeletions in the SHANK1 locus have been reported in five males with autism spectrum disorders. Four of these individuals were part of a multigenerational family in which males were diagnosed with  high-functioning autism spectrum disorder while females were unaffected. The fifth male patient, who also had high functioning ASD, was from an unrelated family and had a de dorene deletion in the same locus.    These findings in the SHANK1 gene may indeed explain both brothers because Jaidens more severely affected than Fidencio and the gene may function as both autosomal recessive and dominant. Interestingly, their father also exhibited several features of high functioning ASD as a child. 2 SRINIVASAN reanalyses came back with the same results. SRINIVASAN on Fidencio is currently pending. If negative, we can also consider Whole Genome Sequencing (WGS).    As previously discussed, Adeola mother was found to have a pathogenic variant 2797G>A (V933I) in OPA1 gene causing autosomal dominant optic atrophy type 1 (OPA1) http://www.ncbi.nlm.nih.gov/books/DHV9701 (on the 200-gene panel for nuclear mitochondrial genes at [facility]). I did recommend an eye exam which she has not done yet so Maximiliano encouraged her to do so. OPA1 can also cause mitochondrial dysfunction and is associated with variable phenotypes such as ptosis (the mother has it), ophthalmoplegia, sensorineural hearing loss (she says that she doesnt have a hearing problem but hasnt had a hearing test), ataxia, and myopathy, suggesting that variant of OPA1 may be responsible for a continuum of phenotypes ranging from mild disorders affecting only the retinal ganglion cells to a severe and multisystemic disease.    Jaiden and Jeremy fortunately did not inherit the OPA1 variant from their mother so their disease process is different from their moms although their symptoms overlap. 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 Again, muscle biopsy may be helpful on the mother. Recent evidence suggests that  m.6712A>T is likely benign.    RECOMMENDATIONS:   1. SRINIVASAN on Fidencio.  2. If negative, consider WGS.   3. Importance of exercise and nutrition has been discussed.  4. Well schedule a clinic follow-up when itll be safer from the standpoint of the COVID-19 pandemic. Maximiliano gone over the precautions including social distancing (total isolation in the patients case due to her old age and frail health) as well as washing hands for at least 20 sec, using a hand  and mask as needed. I advised to watch for fever, cough or shortness of breath and immediately call the PCP.    All questions were fully answered and the mother agreed with the plan.    TIME SPENT: 60 minutes, >50% was spent in counseling via a virtual visit. The note is in epic.     Steve Milton M.D.   Section Head - Medical Genetics   [facility]

## 2020-06-17 ENCOUNTER — OFFICE VISIT (OUTPATIENT)
Dept: PEDIATRIC NEUROLOGY | Facility: CLINIC | Age: 20
End: 2020-06-17
Payer: COMMERCIAL

## 2020-06-17 DIAGNOSIS — G40.909 SEIZURE DISORDER: Primary | Chronic | ICD-10-CM

## 2020-06-17 PROCEDURE — 99212 OFFICE O/P EST SF 10 MIN: CPT | Mod: 95,,, | Performed by: PSYCHIATRY & NEUROLOGY

## 2020-06-17 PROCEDURE — 99212 PR OFFICE/OUTPT VISIT, EST, LEVL II, 10-19 MIN: ICD-10-PCS | Mod: 95,,, | Performed by: PSYCHIATRY & NEUROLOGY

## 2020-06-17 RX ORDER — DIAZEPAM ORAL 5 MG/5ML
SOLUTION ORAL
Qty: 330 ML | Refills: 4 | Status: SHIPPED | OUTPATIENT
Start: 2020-06-17 | End: 2020-11-09 | Stop reason: SDUPTHER

## 2020-06-17 NOTE — PROGRESS NOTES
The patient location is: home  The chief complaint leading to consultation is: seizure disorder    Visit type: Virtual visit with synchronous audio and video    Face to Face time with patient: 15 minutes of total time spent on the encounter, which includes face to face time and non-face to face time preparing to see the patient (eg, review of tests), Obtaining and/or reviewing separately obtained history, Documenting clinical information in the electronic or other health record, Independently interpreting results (not separately reported) and communicating results to the patient/family/caregiver, or Care coordination (not separately reported).         Each patient to whom he or she provides medical services by telemedicine is:  (1) informed of the relationship between the physician and patient and the respective role of any other health care provider with respect to management of the patient; and (2) notified that he or she may decline to receive medical services by telemedicine and may withdraw from such care at any time.    Notes:   Jaiden Sena is a 19-year-old male child who was initially seen by me on 11/17/2009.  I am seeing him today via telemedicine with his mother and his brother.  He carries the diagnosis  of seizures associated with mitochondrial disease.    I last saw the child on 01/06/2020.  Please see my original consultation of 11/07/2009 for full history of the chief complaint.    Initial the child was on phenobarbital for seizure disorder.  It did not work.  He has been on Valium for his seizures.  He remains on 11 0.5 cc p.o. q.h.s..  We have talked about decreasing by 0.5 cc every other month for long time.  It has not happened.  Therefore, I had once again asked Mom to cut back.  He remains on 11 1/2 cc p.o. q.h.s.    The child was seen by Genetics recently.  Mother says he is not doing well.  His energy is very low.  He is receiving IV fluids 1 to 2 times a month.  Mom says his weight is down  to 102 lb.  There is a chart note from 2018 of a weight of 126 lb.    Mom says he is sleeping well.  He is eating but not a lot.    As per mother, weight is 102 lb.  He looks very sad.  He does not speak to me today.    Last time he was here, he looked confused.  He does not look confused today.  However, he does not interact with me.  Is that the camera?  Is that confusion?  Is that lethargy?    He is not wearing his glasses today.    He walks without difficulty.  He has no tremor.    I have written a prescription for Valium 11 cc p.o. q.h.s..  It is interesting that last time I wrote for 11 cc p.o. q.h.s., and mother said he was on 11 1/2 cc p.o. q.h.s..  So the bottom line is, I have written for 11 cc.  I have asked Mom to continue to decrease the diazepam by 0.5 cc qo month.    According to mother, genetics is planning to do further testing including fasting labs.  I am very concerned about his weight loss.    I will see him back in 4 months or sooner.

## 2020-10-07 ENCOUNTER — PATIENT MESSAGE (OUTPATIENT)
Dept: GENETICS | Facility: CLINIC | Age: 20
End: 2020-10-07

## 2020-10-15 ENCOUNTER — PATIENT MESSAGE (OUTPATIENT)
Dept: NEUROLOGY | Facility: CLINIC | Age: 20
End: 2020-10-15

## 2020-10-20 ENCOUNTER — TELEPHONE (OUTPATIENT)
Dept: GENETICS | Facility: CLINIC | Age: 20
End: 2020-10-20

## 2020-11-04 ENCOUNTER — PATIENT MESSAGE (OUTPATIENT)
Dept: GENETICS | Facility: CLINIC | Age: 20
End: 2020-11-04

## 2020-11-09 ENCOUNTER — OFFICE VISIT (OUTPATIENT)
Dept: NEUROLOGY | Facility: CLINIC | Age: 20
End: 2020-11-09
Payer: COMMERCIAL

## 2020-11-09 VITALS
DIASTOLIC BLOOD PRESSURE: 66 MMHG | SYSTOLIC BLOOD PRESSURE: 106 MMHG | TEMPERATURE: 98 F | WEIGHT: 94.56 LBS | HEIGHT: 60 IN | HEART RATE: 92 BPM | RESPIRATION RATE: 14 BRPM | BODY MASS INDEX: 18.56 KG/M2

## 2020-11-09 DIAGNOSIS — G40.909 SEIZURE DISORDER: Primary | ICD-10-CM

## 2020-11-09 DIAGNOSIS — E88.40 DISORDER OF MITOCHONDRIAL METABOLISM: ICD-10-CM

## 2020-11-09 DIAGNOSIS — F84.0 AUTISM: ICD-10-CM

## 2020-11-09 PROCEDURE — 99214 PR OFFICE/OUTPT VISIT, EST, LEVL IV, 30-39 MIN: ICD-10-PCS | Mod: S$GLB,,, | Performed by: PSYCHIATRY & NEUROLOGY

## 2020-11-09 PROCEDURE — 3008F PR BODY MASS INDEX (BMI) DOCUMENTED: ICD-10-PCS | Mod: CPTII,S$GLB,, | Performed by: PSYCHIATRY & NEUROLOGY

## 2020-11-09 PROCEDURE — 99999 PR PBB SHADOW E&M-EST. PATIENT-LVL III: CPT | Mod: PBBFAC,,, | Performed by: PSYCHIATRY & NEUROLOGY

## 2020-11-09 PROCEDURE — 3008F BODY MASS INDEX DOCD: CPT | Mod: CPTII,S$GLB,, | Performed by: PSYCHIATRY & NEUROLOGY

## 2020-11-09 PROCEDURE — 99214 OFFICE O/P EST MOD 30 MIN: CPT | Mod: S$GLB,,, | Performed by: PSYCHIATRY & NEUROLOGY

## 2020-11-09 PROCEDURE — 99999 PR PBB SHADOW E&M-EST. PATIENT-LVL III: ICD-10-PCS | Mod: PBBFAC,,, | Performed by: PSYCHIATRY & NEUROLOGY

## 2020-11-09 RX ORDER — DIAZEPAM ORAL 5 MG/5ML
SOLUTION ORAL
Qty: 330 ML | Refills: 5 | Status: SHIPPED | OUTPATIENT
Start: 2020-11-09 | End: 2021-04-29 | Stop reason: SDUPTHER

## 2020-11-09 NOTE — PROGRESS NOTES
"    HPI: Jaiden Sena is a 20 y.o. male with seizure.    Per the patient's mother, the patient has  Autism, Mitochondrial/Metabolic Dysfunction, and Metabolic Noctornal Seizures, that have been under control. Seizure free 7 years, except for once when reduction of meds was too rapid.  "  Valium has been the most effective med for him    He was seeing Dr Leger (peds Neuro), but she has left [facility]    Patient is here with his mom today.    Patient remains seizure free. He is taking Valium solution (5mg/5ml per Rx) and is at 11 cc at night. Mom feels he can slowly lower to dose.     He has not been drowsy. Had a dizziness spell with near fall on waking a year ago when valium dose was being reduced then.    LEXIS check reviewed      PCP follows labs, weight.     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        Exam:  Gen Appearance, well developed/nourished in no apparent distress  CV: 2+ distal pulses with no edema or swelling  Neuro:  MS: Awake, alert,  Sustains attention. Recent/remote memory intact, Language is full to spontaneous speech/repetition/naming/comprehension but speaks infrequently. Fund of Knowledge is less than expected  Patient has cognition consistent with a pervasive developmental disorder  CN: Optic discs are flat with normal vasculature, PERRL, Extraoccular movements and visual fields are full. Normal facial sensation and strength, Hearing symmetric, Tongue and Palate are midline and strong. Shoulder Shrug symmetric and strong.  Motor: Normal bulk, tone appears chronically hypotonic, no abnormal movements. 5/5 strength bilateral upper/lower extremities with 2+ reflexes and no clonus  Sensory: difficulty cooperating. Romberg negative  Cerebellar: " "Finger-nose,Heal-shin, Rapid alternating movements intact  Gait: Normal stance, no ataxia but gait is mildly hypotonic (chronic per mom)        Assessment/Plan: Jaiden Sena is a 20 y.o. male with Autism, nocturnal seizures, and mitochondrial disorder    I recommend:   1. [facility] 2009 showed no abnormalities and EEG done 2009 showed epileptiform spikes in the right parietal temporal region with focal slowing there  -originally thought to be due to benign rolandic changes and seizures were also thought to be provoked by metabolic changes at that time (renal). Patient is at risk for other seizures disorders related to mitochondrial disease.   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 Per Dr Leger's note, "Initially the child was on phenobarbital for seizure disorder.  It did not work.  He has been on Valium for his seizures.  He remains on 11 0.5 cc p.o. q.h.s..  We have talked about decreasing by 0.5 cc every other month for long time.  It has not happened.  Therefore, I had once again asked Mom to cut back.  He remains on 11 1/2 cc p.o. q.h.s."  Per Dr Leger's last note, "It is interesting that last time I wrote for 11 cc p.o. q.h.s., and mother said he was on 11 1/2 cc p.o. q.h.s..  So the bottom line is, I have written for 11 cc.  I have asked Mom to continue to decrease the diazepam by 0.5 cc (every other)  month."  -patient's seizures have not been active in over 7 years    3. Mom states she certainly agrees to reduce to 10.5 cc of Valium solution (5mg/5ml per prior Rx) SLOWLY unless seizures. Goal of mom and me would be to slowly reduce to  valium if tolerated or switch to another AED if more seizures. Can likely try to reduce to 10cc after the next visit.    RTC 6 months          "

## 2020-11-18 ENCOUNTER — PATIENT MESSAGE (OUTPATIENT)
Dept: GENETICS | Facility: CLINIC | Age: 20
End: 2020-11-18

## 2020-11-18 ENCOUNTER — OFFICE VISIT (OUTPATIENT)
Dept: GENETICS | Facility: CLINIC | Age: 20
End: 2020-11-18
Payer: COMMERCIAL

## 2020-11-18 DIAGNOSIS — F84.0 AUTISM: ICD-10-CM

## 2020-11-18 DIAGNOSIS — Q99.9 GENETIC SYNDROME: ICD-10-CM

## 2020-11-18 DIAGNOSIS — E88.40 DISORDER OF MITOCHONDRIAL METABOLISM: Primary | ICD-10-CM

## 2020-11-18 DIAGNOSIS — M62.81 MUSCLE WEAKNESS (GENERALIZED): ICD-10-CM

## 2020-11-18 PROCEDURE — 99215 PR OFFICE/OUTPT VISIT, EST, LEVL V, 40-54 MIN: ICD-10-PCS | Mod: 95,,, | Performed by: MEDICAL GENETICS

## 2020-11-18 PROCEDURE — 99215 OFFICE O/P EST HI 40 MIN: CPT | Mod: 95,,, | Performed by: MEDICAL GENETICS

## 2020-11-19 NOTE — PROGRESS NOTES
Jaiden Sena   DOS: 18  : [birth year]  MRN: [id number]    TELEMEDICINE VIDEO VISIT    The patient location is: [facility]  The chief complaint leading to consultation is: autism  Total time spent with patient: 60 minutes  Visit type: Virtual visit with synchronous audio and video    Each patient to whom he or she provides medical services by telemedicine is: (1) informed of the relationship between the physician and patient and the respective role of any other health care provider with respect to management of the patient; and (2) notified that he or she may decline to receive medical services by telemedicine and may withdraw from such care at any time.    PRESENT ILLNESS: I have seen this 20-year-old  male with his 20-year-old brother Jamie previously for evaluation of possible genetic etiology of his high-functioning autism and history of developmental delay. I could not appreciate any well-recognizable genetic syndrome and obtained several genetic tests including chromosomal oligo array, growth hormone, and metabolic studies. His fragile X was normal in the past, and his metabolic studies were significant for high lactic acid levels at 3.89 and high lactic-to-pyruvic acid levels as well as abnormal ratios of plasma amino acids suggestive of mitochondrial dysfunction (alanine to lysine and alanine to the sum of phenylalanine and tyrosine. 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 Maximiliano tested his mom ([id number]) who may have a mitochondrial disorder and she also has the variant. I've also tested his grandmother who has the same variant. All benefitted from CoQ10 but effect was limited. His whole exome sequencing (SRINIVASAN) in  and then 2 reanalyses in  and  did reveal significant results as discussed below.    Jaiden now returns for a virtual follow-up with his brother, and mother. In the interim, he continues to struggle with weight but it  has stabilized and his BMI is at the lower end of normal (18.5). He still is about 10 lbs under his baseline weight. His fatigue persists but has gotten better. Hes on mitochondrial vitamin cocktail. Both brothers are homeschooled and havent had much exercise due to COVID restrictions.    PAST MEDICAL HISTORY:   Seizure disorder    Disorder of mitochondrial metabolism    Autism    Muscle weakness (generalized)    Family history of neurological disorder    Myopia of both eyes    Genetic syndrome    Weight loss    MEDICATIONS:   b complex vitamins capsule     co-enzyme Q-10 30 mg capsule    diazePAM (VALIUM) oral solution    vitamin D 1000 units Tab     ALLERGIES:   Lactated Ringers  Peanuts  Propofol  Sulfa (Sulfonamide Antibiotics)Swelling    FAMILY HISTORY: As above, Jaiden has a 19-year-old brother Jamie ([id number]), also with autism which is milder than in Jaiden. Mitochondrial disease has been suspected but not definitively confirmed. The mother is 54 years old and has OPA1 mitochondrial disorder which neither of her sons inherited. She has fatigue and MCAS. Further family history is unremarkable. The parents denied consanguinity.      PHYSICAL EXAM: On 11/9/20 his weight 94 lbs while height 50, BMI 18.5. Again, Jaiden is normocephalic and has no significant dysmorphic facial features. He looks more proportionnate today. Hes learning Samoan and told me kak jon or hi and salute in Samoan. He does appear thin.    IMPRESSION: At this time, I again discussed the SRINIVASAN results. 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 His brother Fidencio only had one variant [id number].    The SHANK1 gene is a member of the SHANK gene family which encodes scaffolding proteins required for the proper formation and function of neuronal synapses.  Microdeletions in the SHANK1 locus have been reported in five males with autism spectrum disorders. Four of these individuals were part of a multigenerational family in which males were diagnosed with high-functioning autism spectrum disorder while females were unaffected. The fifth male patient, who also had high functioning ASD, was from an unrelated family and had a de dorene deletion in the same locus.    These findings in the SHANK1 gene may indeed explain both brothers because Jaidens more severely affected than Fidencio and the gene may function as both autosomal recessive and dominant. Interestingly, their father also exhibited several features of high functioning ASD as a child. 2 SRINIVASAN reanalyses came back with the same results. SRINIVASAN on Fidencio has still not been done and my assistant will check on that. If negative, we can also consider rerunning SRINIVASAN on a new technology or Whole Genome Sequencing (WGS).    As previously discussed, Adeola mother was found to have a pathogenic variant 2797G>A (V933I) in OPA1 gene causing autosomal dominant optic atrophy type 1 (OPA1) http://www.ncbi.nlm.nih.gov/books/FJH0960 (on the 200-gene panel for nuclear mitochondrial genes at [facility]). I did recommend an eye exam which she has not done yet so Maximiliano encouraged her to do so. OPA1 can also cause mitochondrial dysfunction and is associated with variable phenotypes such as ptosis (the mother has it), ophthalmoplegia, sensorineural hearing loss (she says that she doesnt have a hearing problem but hasnt had a hearing test), ataxia, and myopathy, suggesting that variant of OPA1 may be responsible for a continuum of phenotypes ranging from mild disorders affecting only the retinal ganglion cells to a severe and multisystemic disease.    Jaiden and Jeremy fortunately did not inherit the OPA1 variant from their mother so their disease process is different from their moms although their symptoms overlap. Its possible that the homoplasmic  variant 6712A>T of unclear significance (it could be a pathogenic variant but it should be correlated with biochemical testing on muscle) has contributed to both boys and their moms phenotypes. Again, muscle biopsy may be helpful on the mother. Recent evidence suggests that m.6712A>T is likely benign.    RECOMMENDATIONS:   1. SRINIVASAN on Fidencio (will check on status).  2. If negative, consider rerunning SRINIVASAN on a new technology or WGS.   3. Importance of exercise and nutrition has been discussed.    TIME SPENT: 60 minutes, >50% was spent in counseling via a virtual visit. The note is in epic.     Steve Milton M.D.   Section Head - Medical Genetics   [facility]

## 2021-04-29 RX ORDER — DIAZEPAM ORAL 5 MG/5ML
SOLUTION ORAL
Qty: 330 ML | Refills: 5 | Status: SHIPPED | OUTPATIENT
Start: 2021-04-29 | End: 2021-10-14 | Stop reason: SDUPTHER

## 2021-04-30 ENCOUNTER — PATIENT MESSAGE (OUTPATIENT)
Dept: NEUROLOGY | Facility: CLINIC | Age: 21
End: 2021-04-30

## 2021-05-04 ENCOUNTER — PATIENT MESSAGE (OUTPATIENT)
Dept: RESEARCH | Facility: HOSPITAL | Age: 21
End: 2021-05-04

## 2021-05-10 ENCOUNTER — PATIENT MESSAGE (OUTPATIENT)
Dept: NEUROLOGY | Facility: CLINIC | Age: 21
End: 2021-05-10

## 2021-10-14 ENCOUNTER — PATIENT MESSAGE (OUTPATIENT)
Dept: NEUROLOGY | Facility: CLINIC | Age: 21
End: 2021-10-14
Payer: COMMERCIAL

## 2021-10-15 RX ORDER — DIAZEPAM ORAL 5 MG/5ML
SOLUTION ORAL
Qty: 330 ML | Refills: 5 | Status: SHIPPED | OUTPATIENT
Start: 2021-10-15 | End: 2022-03-23 | Stop reason: SDUPTHER

## 2021-11-02 ENCOUNTER — OFFICE VISIT (OUTPATIENT)
Dept: NEUROLOGY | Facility: CLINIC | Age: 21
End: 2021-11-02
Payer: COMMERCIAL

## 2021-11-02 DIAGNOSIS — E88.40 DISORDER OF MITOCHONDRIAL METABOLISM: ICD-10-CM

## 2021-11-02 DIAGNOSIS — F84.0 AUTISM: ICD-10-CM

## 2021-11-02 DIAGNOSIS — G40.909 SEIZURE DISORDER: Primary | ICD-10-CM

## 2021-11-02 PROCEDURE — 1159F PR MEDICATION LIST DOCUMENTED IN MEDICAL RECORD: ICD-10-PCS | Mod: CPTII,95,, | Performed by: PSYCHIATRY & NEUROLOGY

## 2021-11-02 PROCEDURE — 1159F MED LIST DOCD IN RCRD: CPT | Mod: CPTII,95,, | Performed by: PSYCHIATRY & NEUROLOGY

## 2021-11-02 PROCEDURE — 99214 OFFICE O/P EST MOD 30 MIN: CPT | Mod: 95,,, | Performed by: PSYCHIATRY & NEUROLOGY

## 2021-11-02 PROCEDURE — 99214 PR OFFICE/OUTPT VISIT, EST, LEVL IV, 30-39 MIN: ICD-10-PCS | Mod: 95,,, | Performed by: PSYCHIATRY & NEUROLOGY

## 2022-03-22 ENCOUNTER — PATIENT MESSAGE (OUTPATIENT)
Dept: NEUROLOGY | Facility: CLINIC | Age: 22
End: 2022-03-22
Payer: COMMERCIAL

## 2022-08-09 ENCOUNTER — OFFICE VISIT (OUTPATIENT)
Dept: NEUROLOGY | Facility: CLINIC | Age: 22
End: 2022-08-09
Payer: COMMERCIAL

## 2022-08-09 DIAGNOSIS — F84.0 AUTISM: ICD-10-CM

## 2022-08-09 DIAGNOSIS — E88.40 DISORDER OF MITOCHONDRIAL METABOLISM: ICD-10-CM

## 2022-08-09 DIAGNOSIS — G40.909 SEIZURE DISORDER: Primary | ICD-10-CM

## 2022-08-09 PROCEDURE — 1159F PR MEDICATION LIST DOCUMENTED IN MEDICAL RECORD: ICD-10-PCS | Mod: CPTII,95,, | Performed by: PSYCHIATRY & NEUROLOGY

## 2022-08-09 PROCEDURE — 1160F PR REVIEW ALL MEDS BY PRESCRIBER/CLIN PHARMACIST DOCUMENTED: ICD-10-PCS | Mod: CPTII,95,, | Performed by: PSYCHIATRY & NEUROLOGY

## 2022-08-09 PROCEDURE — 1159F MED LIST DOCD IN RCRD: CPT | Mod: CPTII,95,, | Performed by: PSYCHIATRY & NEUROLOGY

## 2022-08-09 PROCEDURE — 1160F RVW MEDS BY RX/DR IN RCRD: CPT | Mod: CPTII,95,, | Performed by: PSYCHIATRY & NEUROLOGY

## 2022-08-09 PROCEDURE — 99213 OFFICE O/P EST LOW 20 MIN: CPT | Mod: 95,,, | Performed by: PSYCHIATRY & NEUROLOGY

## 2022-08-09 PROCEDURE — 99213 PR OFFICE/OUTPT VISIT, EST, LEVL III, 20-29 MIN: ICD-10-PCS | Mod: 95,,, | Performed by: PSYCHIATRY & NEUROLOGY

## 2022-08-09 NOTE — PROGRESS NOTES
The patient location is: home  The chief complaint leading to consultation is: seizure history    Visit type: audiovisual    Face to Face time with patient: 11 minutes  20 minutes of total time spent on the encounter, which includes face to face time and non-face to face time preparing to see the patient (eg, review of tests), Obtaining and/or reviewing separately obtained history, Documenting clinical information in the electronic or other health record, Independently interpreting results (not separately reported) and communicating results to the patient/family/caregiver, or Care coordination (not separately reported).         Each patient to whom he or she provides medical services by telemedicine is:  (1) informed of the relationship between the physician and patient and the respective role of any other health care provider with respect to management of the patient; and (2) notified that he or she may decline to receive medical services by telemedicine and may withdraw from such care at any time.    Notes:       HPI: Jaiden Sena is a 21 y.o. male with seizure,   Autism, Mitochondrial/Metabolic Dysfunction.       Scheduled a few months sooner this his recommended annual follow up    Seizures free still clincally    Valium dose is currently 10cc    Had some hiccups infrequently. Had some for an hour last night and relieved with burping. Has happened for year  Has a history of acid reflux    Sleep is good    Does not appear sedated    PCP follows labs, weight    ROS    Unreliable due to pervasive development disorder    I have reviewed all of this patient's past medical and surgical histories as well as family and social histories and active allergies and medications as documented in the electronic medical record.        Exam:  Gen Appearance, well developed/nourished in no apparent distress  Neuro:  MS: Awake, alert,   speaks infrequently. Fund of Knowledge is less than expected  Patient has cognition  "consistent with a pervasive developmental disorder  CN: Normal facial  Strength  Motor: moves all extremities without difficulty    The remainder of the exam is limited due to telemedicine nature of the visit          Assessment/Plan: Jaiden Sena is a 21 y.o. male with Autism, nocturnal seizures, and mitochondrial disorder    I recommend:   1. [facility] 2009 showed no abnormalities and EEG done 2009 showed epileptiform spikes in the right parietal temporal region with focal slowing there  -originally thought to be due to benign rolandic changes and seizures were also thought to be provoked by metabolic changes at that time (renal). Patient is at risk for other seizures disorders related to mitochondrial disease.   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 Per Dr Leger's note, "Initially the child was on phenobarbital for seizure disorder.  It did not work.  He has been on Valium for his seizures.    We have talked about decreasing""  -patient's seizures have not been active in over 8 years  -Update EEG suggested prior to any further valium tapering. Mom would like to hold on this due to Covid pandemic. She will contact me if any seizures are noted and reconsider at the next vsiit      3. Patient has reduced to 10 cc of Valium solution (5mg/5ml per prior Rx) without seizures. Currently not sedated. Ideally, would like to see him continue to taper but need to consider seizure risk over time.    4. Infrequent hiccups may be related to acid reflux by history as discussed with mom. No specific treatment needed at this time    RTC  1 year            "

## 2022-10-10 ENCOUNTER — PATIENT MESSAGE (OUTPATIENT)
Dept: NEUROLOGY | Facility: CLINIC | Age: 22
End: 2022-10-10
Payer: COMMERCIAL

## 2023-02-09 ENCOUNTER — PATIENT MESSAGE (OUTPATIENT)
Dept: NEUROLOGY | Facility: CLINIC | Age: 23
End: 2023-02-09
Payer: COMMERCIAL

## 2023-06-08 RX ORDER — DIAZEPAM ORAL 5 MG/5ML
10 SOLUTION ORAL NIGHTLY
Qty: 300 ML | Refills: 3 | Status: SHIPPED | OUTPATIENT
Start: 2023-06-08 | End: 2023-10-06 | Stop reason: SDUPTHER

## 2023-08-21 ENCOUNTER — PATIENT MESSAGE (OUTPATIENT)
Dept: NEUROLOGY | Facility: CLINIC | Age: 23
End: 2023-08-21

## 2023-09-07 ENCOUNTER — OFFICE VISIT (OUTPATIENT)
Dept: NEUROLOGY | Facility: CLINIC | Age: 23
End: 2023-09-07
Payer: COMMERCIAL

## 2023-09-07 DIAGNOSIS — F84.0 AUTISM: ICD-10-CM

## 2023-09-07 DIAGNOSIS — R06.6 HICCUPS: ICD-10-CM

## 2023-09-07 DIAGNOSIS — E88.40 DISORDER OF MITOCHONDRIAL METABOLISM: ICD-10-CM

## 2023-09-07 DIAGNOSIS — G40.909 SEIZURE DISORDER: Primary | ICD-10-CM

## 2023-09-07 PROCEDURE — 99214 PR OFFICE/OUTPT VISIT, EST, LEVL IV, 30-39 MIN: ICD-10-PCS | Mod: 95,,, | Performed by: PSYCHIATRY & NEUROLOGY

## 2023-09-07 PROCEDURE — 1160F PR REVIEW ALL MEDS BY PRESCRIBER/CLIN PHARMACIST DOCUMENTED: ICD-10-PCS | Mod: CPTII,95,, | Performed by: PSYCHIATRY & NEUROLOGY

## 2023-09-07 PROCEDURE — 1160F RVW MEDS BY RX/DR IN RCRD: CPT | Mod: CPTII,95,, | Performed by: PSYCHIATRY & NEUROLOGY

## 2023-09-07 PROCEDURE — 1159F PR MEDICATION LIST DOCUMENTED IN MEDICAL RECORD: ICD-10-PCS | Mod: CPTII,95,, | Performed by: PSYCHIATRY & NEUROLOGY

## 2023-09-07 PROCEDURE — 99214 OFFICE O/P EST MOD 30 MIN: CPT | Mod: 95,,, | Performed by: PSYCHIATRY & NEUROLOGY

## 2023-09-07 PROCEDURE — 1159F MED LIST DOCD IN RCRD: CPT | Mod: CPTII,95,, | Performed by: PSYCHIATRY & NEUROLOGY

## 2023-09-07 NOTE — PROGRESS NOTES
The patient location is: home  The chief complaint leading to consultation is: seizures    Visit type: audiovisual    Face to Face time with patient: 20 minutes   30 minutes of total time spent on the encounter, which includes face to face time and non-face to face time preparing to see the patient (eg, review of tests), Obtaining and/or reviewing separately obtained history, Documenting clinical information in the electronic or other health record, Independently interpreting results (not separately reported) and communicating results to the patient/family/caregiver, or Care coordination (not separately reported).         Each patient to whom he or she provides medical services by telemedicine is:  (1) informed of the relationship between the physician and patient and the respective role of any other health care provider with respect to management of the patient; and (2) notified that he or she may decline to receive medical services by telemedicine and may withdraw from such care at any time.    Notes:         HPI: Jaiden Sena is a 22 y.o. male with seizure,   Autism, Mitochondrial/Metabolic Dysfunction.       Presents for yearly follow up    He remains seizures free    Valium dose is currently 10ml (10 mg) nightly     Mom notes: there was a partial questionable episode in 2019 per mom in the middle of Valium taper    Sleep  is good     Mood  is good    No sedation noted    Mom is worried about Jaiden's ability to tolerate EEG    Hiccups are not noted much lately       PCP follows labs but he has not had this in some time       Patient has been gaining weight after having lost for unclear reasons and involving him in eating has helped      ROS    Unreliable due to pervasive development disorder    I have reviewed all of this patient's past medical and surgical histories as well as family and social histories and active allergies and medications as documented in the electronic medical record.        Exam:  Gen  "Appearance, well developed/nourished in no apparent distress  Neuro:  MS: Awake, alert,   speaks infrequently. Fund of Knowledge is less than expected  Does not appear sedated  Patient has cognition consistent with a pervasive developmental disorder  CN: Normal facial  Strength  Motor: moves all extremities without difficulty    The remainder of the exam is limited due to telemedicine nature of the visit          Assessment/Plan: Jaiden Sena is a 22 y.o. male with Autism, nocturnal seizures, and mitochondrial disorder    I recommend:   1. [facility] brain done in 2009 showed no abnormalities and EEG done 2009 showed epileptiform spikes in the right parietal temporal region with focal slowing there  -originally thought to be due to benign rolandic changes and seizures were also thought to be provoked by metabolic changes at that time (renal). Patient is at risk for other seizures disorders related to mitochondrial disease.   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 Per Dr Leger's note, "Initially the child was on phenobarbital for seizure disorder.  It did not work.  He has been on Valium for his seizures.    We have talked about decreasing""      3. Patient has reduced to 10 cc of Valium solution (5mg/5ml per prior Rx) without seizures but there was a partial questionable episode in 2019 per mom. Currently not sedated. Ideally, would like to see him continue to taper but need to consider seizure risk over time. Mom does not feel he would well tolerate an EEG which would need to be updated prior to any reduction in meds  -He had previously decreased from higher dose  per mom  -Will keep him at the same dose Valium unless side effects  -Suggested routine labs/ mom will arrange via PCP. Fax results to me when done      4. Infrequent hiccups may be related to acid reflux by history as discussed with mom prior. No specific treatment needed " at this time. Monitor    RTC  1 year

## 2023-10-05 ENCOUNTER — PATIENT MESSAGE (OUTPATIENT)
Dept: NEUROLOGY | Facility: CLINIC | Age: 23
End: 2023-10-05
Payer: COMMERCIAL

## 2023-10-05 RX ORDER — DIAZEPAM ORAL 5 MG/5ML
10 SOLUTION ORAL NIGHTLY
Qty: 300 ML | Refills: 3 | Status: CANCELLED | OUTPATIENT
Start: 2023-10-05

## 2023-10-06 NOTE — TELEPHONE ENCOUNTER
Spoke with patient mother. States that patient does not have enough valium to cover the weekend. Contacted pharmacy and called in a 1 month supply. Patient mother notified. Voiced understanding.

## 2023-10-09 RX ORDER — DIAZEPAM ORAL 5 MG/5ML
10 SOLUTION ORAL NIGHTLY
Qty: 300 ML | Refills: 3 | Status: SHIPPED | OUTPATIENT
Start: 2023-10-09 | End: 2024-02-27 | Stop reason: SDUPTHER

## 2023-12-01 ENCOUNTER — PATIENT MESSAGE (OUTPATIENT)
Dept: NEUROLOGY | Facility: CLINIC | Age: 23
End: 2023-12-01
Payer: COMMERCIAL

## 2024-02-27 RX ORDER — DIAZEPAM ORAL 5 MG/5ML
10 SOLUTION ORAL NIGHTLY
Qty: 300 ML | Refills: 3 | Status: SHIPPED | OUTPATIENT
Start: 2024-03-06

## 2024-06-26 DIAGNOSIS — G40.909 SEIZURE DISORDER: Primary | ICD-10-CM

## 2024-06-27 RX ORDER — DIAZEPAM ORAL 5 MG/5ML
10 SOLUTION ORAL NIGHTLY
Qty: 300 ML | Refills: 3 | Status: SHIPPED | OUTPATIENT
Start: 2024-06-27

## 2024-10-29 ENCOUNTER — PATIENT MESSAGE (OUTPATIENT)
Dept: NEUROLOGY | Facility: CLINIC | Age: 24
End: 2024-10-29
Payer: COMMERCIAL

## 2024-10-29 DIAGNOSIS — G40.909 SEIZURE DISORDER: ICD-10-CM

## 2024-10-29 RX ORDER — DIAZEPAM ORAL 5 MG/5ML
10 SOLUTION ORAL NIGHTLY
Qty: 300 ML | Refills: 0 | Status: SHIPPED | OUTPATIENT
Start: 2024-10-29

## 2024-10-30 ENCOUNTER — OFFICE VISIT (OUTPATIENT)
Dept: NEUROLOGY | Facility: CLINIC | Age: 24
End: 2024-10-30
Payer: COMMERCIAL

## 2024-10-30 DIAGNOSIS — G40.909 SEIZURE DISORDER: Primary | ICD-10-CM

## 2024-10-30 DIAGNOSIS — R06.6 HICCUPS: ICD-10-CM

## 2024-10-30 DIAGNOSIS — E88.40 DISORDER OF MITOCHONDRIAL METABOLISM: ICD-10-CM

## 2024-10-30 DIAGNOSIS — F84.0 AUTISM: ICD-10-CM

## 2024-10-30 PROCEDURE — 1159F MED LIST DOCD IN RCRD: CPT | Mod: CPTII,95,, | Performed by: PSYCHIATRY & NEUROLOGY

## 2024-10-30 PROCEDURE — 99214 OFFICE O/P EST MOD 30 MIN: CPT | Mod: 95,,, | Performed by: PSYCHIATRY & NEUROLOGY

## 2024-10-30 PROCEDURE — 1160F RVW MEDS BY RX/DR IN RCRD: CPT | Mod: CPTII,95,, | Performed by: PSYCHIATRY & NEUROLOGY

## 2024-11-24 DIAGNOSIS — G40.909 SEIZURE DISORDER: ICD-10-CM

## 2024-11-25 RX ORDER — DIAZEPAM ORAL 5 MG/5ML
10 SOLUTION ORAL NIGHTLY
Qty: 300 ML | Refills: 5 | Status: SHIPPED | OUTPATIENT
Start: 2024-11-25

## 2025-05-30 DIAGNOSIS — G40.909 SEIZURE DISORDER: ICD-10-CM

## 2025-05-30 RX ORDER — DIAZEPAM ORAL 5 MG/5ML
10 SOLUTION ORAL NIGHTLY
Qty: 300 ML | Refills: 5 | Status: CANCELLED | OUTPATIENT
Start: 2025-05-30

## 2025-06-03 ENCOUNTER — PATIENT MESSAGE (OUTPATIENT)
Dept: NEUROLOGY | Facility: CLINIC | Age: 25
End: 2025-06-03
Payer: COMMERCIAL